# Patient Record
Sex: MALE | Race: WHITE | Employment: FULL TIME | URBAN - METROPOLITAN AREA
[De-identification: names, ages, dates, MRNs, and addresses within clinical notes are randomized per-mention and may not be internally consistent; named-entity substitution may affect disease eponyms.]

---

## 2018-12-03 PROBLEM — I10 ESSENTIAL HYPERTENSION: Status: ACTIVE | Noted: 2018-12-03

## 2018-12-03 PROBLEM — R74.8 ELEVATED LIVER ENZYMES: Status: ACTIVE | Noted: 2018-12-03

## 2018-12-18 PROBLEM — R01.1 MURMUR: Status: ACTIVE | Noted: 2018-12-18

## 2019-03-04 PROBLEM — R79.89 ELEVATED LIVER FUNCTION TESTS: Status: ACTIVE | Noted: 2019-03-04

## 2019-03-04 PROBLEM — K76.0 FATTY LIVER: Status: ACTIVE | Noted: 2019-03-04

## 2020-06-01 PROBLEM — F43.29 STRESS AND ADJUSTMENT REACTION: Status: ACTIVE | Noted: 2020-06-01

## 2023-07-31 ENCOUNTER — TELEPHONE (OUTPATIENT)
Dept: GASTROENTEROLOGY | Age: 33
End: 2023-07-31

## 2023-07-31 NOTE — TELEPHONE ENCOUNTER
1st attempt: [employer] asking for return phone call for patient to contact office for scheduling. 2nd attempt: writer sent letter to patients home as a reminder to contact office to schedule procedure.